# Patient Record
Sex: FEMALE | Race: WHITE | URBAN - METROPOLITAN AREA
[De-identification: names, ages, dates, MRNs, and addresses within clinical notes are randomized per-mention and may not be internally consistent; named-entity substitution may affect disease eponyms.]

---

## 2017-01-31 RX ORDER — NORETHINDRONE ACETATE AND ETHINYL ESTRADIOL AND FERROUS FUMARATE 1MG-20(21)
KIT ORAL
Qty: 84 TABLET | Refills: 0 | Status: SHIPPED | OUTPATIENT
Start: 2017-01-31 | End: 2017-02-27

## 2017-02-27 ENCOUNTER — OFFICE VISIT (OUTPATIENT)
Dept: OBGYN CLINIC | Facility: CLINIC | Age: 27
End: 2017-02-27

## 2017-02-27 VITALS
HEIGHT: 66 IN | SYSTOLIC BLOOD PRESSURE: 123 MMHG | WEIGHT: 148 LBS | HEART RATE: 77 BPM | BODY MASS INDEX: 23.78 KG/M2 | DIASTOLIC BLOOD PRESSURE: 78 MMHG

## 2017-02-27 DIAGNOSIS — Z76.0 MEDICATION REFILL: ICD-10-CM

## 2017-02-27 DIAGNOSIS — Z01.419 WELL WOMAN EXAM WITH ROUTINE GYNECOLOGICAL EXAM: Primary | ICD-10-CM

## 2017-02-27 PROCEDURE — 99395 PREV VISIT EST AGE 18-39: CPT | Performed by: CLINICAL NURSE SPECIALIST

## 2017-02-27 RX ORDER — NORETHINDRONE ACETATE AND ETHINYL ESTRADIOL 1MG-20(21)
1 KIT ORAL
Qty: 84 TABLET | Refills: 3 | Status: SHIPPED | OUTPATIENT
Start: 2017-02-27

## 2017-02-27 NOTE — PROGRESS NOTES
Flavio Haque is a 23year old female [facility] Patient's last menstrual period was 01/30/2017. Patient presents with:  Gyn Exam: annual  Last annual was 2/18/16. Last pap was 1/29/14 and was normal. Will do pap today.  Periods are regular with OCP and wishes (MICROGESTIN FE 1/20) 1-20 MG-MCG Oral Tab, Take 1 tablet by mouth once daily. , Disp: 84 tablet, Rfl: 3  •  Adapalene (DIFFERIN) 0.1 % External Gel, Use daily as directed, Disp: 45 g, Rfl: 1  •  Cetirizine HCl (ZYRTEC) 5 MG Oral Chew Tab, Chew by mouth., D contour, position, mobility, without tenderness  Adnexa: normal without masses or tenderness  Perineum: normal  Anus: no hemorroids     Assessment & Plan:  Butch Rebollar was seen today for gyn exam.    Diagnoses and all orders for this visit:    Well woman exam

## 2018-10-23 ENCOUNTER — CHARTING TRANS (OUTPATIENT)
Dept: OTHER | Age: 28
End: 2018-10-23

## 2018-10-23 ENCOUNTER — LAB SERVICES (OUTPATIENT)
Dept: OTHER | Age: 28
End: 2018-10-23

## 2018-10-23 LAB
APPEARANCE: NORMAL
BILIRUBIN: NEGATIVE
COLOR: NORMAL
GLUCOSE U: NEGATIVE
KETONES: NEGATIVE
LEUKOCYTES: NORMAL
NITRITE: POSITIVE
OCCULT BLOOD: NORMAL
PH: 6.5
PROTEIN: NORMAL
URINE SPEC GRAVITY: 1
UROBILINOGEN: 0.2

## 2018-10-26 LAB — BACTERIA UR CULT: NORMAL

## 2018-12-08 VITALS
HEIGHT: 66 IN | WEIGHT: 142.42 LBS | DIASTOLIC BLOOD PRESSURE: 76 MMHG | HEART RATE: 81 BPM | BODY MASS INDEX: 22.89 KG/M2 | SYSTOLIC BLOOD PRESSURE: 106 MMHG | OXYGEN SATURATION: 98 % | TEMPERATURE: 98.3 F

## (undated) NOTE — MR AVS SNAPSHOT
After Visit Summary   2/27/2017    Ryanne Gordillo    MRN: [id number]           Visit Information        Provider Department Dept Phone    2/27/2017  9:20 AM SASKIA Londono [facility] [phone]      Your Vitals Were     BP Pulse Ht Wt BMI LM complete it and provide feedback. We strive to deliver the best patient experience and are looking for ways to make improvements. Your feedback will help us do so. For more information on [employer], please visit [email or web address]

## (undated) NOTE — MR AVS SNAPSHOT
[place]  [address]  [phone]               Thank you for choosing us for your health care visit with SASKIA Solano.   We are glad to serve you and happy to provide you with this summar These medications were sent to [facility], [address] [place] [address]., [phone], [phone]  [address] [place] [address], [address]     Phone:  [phone]    - Norethin Ace-Eth Estrad-FE 1-20 MG ofeaturethrottle1\dntblnsbdb\fet4\aendnotes\aftnnrlc\pgbrdrhead\pgbrdrfoot\sectd\bzcpab07168\ltkwau87530\guttersxn0\iotbvhff1973\emhztdrd8219\fnafogdn6143\veosuivu1664\ppovrzk620\tbpburo965\sbkpage\pgncont\pgndec\plain\plain\f0\fs24\ql\plain\f1\fs20\lang10 f0\fs24\ql\plain\f1\fs20\qwcd3531\hich\f1\dbch\f1\loch\f1\fs20 Monolayers:  1\par\par}    Clinical Information {\rtf1\irkoec10743\ansi\dtalwcc2148\ftnbj\uc1\deff0{\fonttbl{\f0 \fswiss MS Sans Serif;}{\f1 \fswiss \fcharset0 Rocheport;}{\f2 \fswiss \fcharset0 MS Visit [employer]  You can access your MyChart to more actively manage your health care and view more details from this visit by going to [email or web address].   If you've recently had a stay at the Hospital you can access your discharge instructions i

## (undated) NOTE — Clinical Note
3/9/2017              Flavio Haque        [address] [place] [address]         Dear Simon French,      It was a pleasure to see you at our [address] [facility] office.  Your Pap was normal. There is no need fo